# Patient Record
Sex: MALE | Race: WHITE | NOT HISPANIC OR LATINO | Employment: FULL TIME | URBAN - METROPOLITAN AREA
[De-identification: names, ages, dates, MRNs, and addresses within clinical notes are randomized per-mention and may not be internally consistent; named-entity substitution may affect disease eponyms.]

---

## 2021-05-26 ENCOUNTER — RECORDS - HEALTHEAST (OUTPATIENT)
Dept: ADMINISTRATIVE | Facility: CLINIC | Age: 26
End: 2021-05-26

## 2021-07-30 ENCOUNTER — HOSPITAL ENCOUNTER (EMERGENCY)
Facility: HOSPITAL | Age: 26
Discharge: HOME | End: 2021-07-30
Attending: STUDENT IN AN ORGANIZED HEALTH CARE EDUCATION/TRAINING PROGRAM | Admitting: STUDENT IN AN ORGANIZED HEALTH CARE EDUCATION/TRAINING PROGRAM
Payer: COMMERCIAL

## 2021-07-30 VITALS
RESPIRATION RATE: 16 BRPM | DIASTOLIC BLOOD PRESSURE: 62 MMHG | SYSTOLIC BLOOD PRESSURE: 121 MMHG | HEART RATE: 48 BPM | WEIGHT: 159.83 LBS | OXYGEN SATURATION: 99 % | TEMPERATURE: 97.7 F

## 2021-07-30 DIAGNOSIS — H00.015 HORDEOLUM EXTERNUM OF LEFT LOWER EYELID: ICD-10-CM

## 2021-07-30 PROCEDURE — 99283 EMERGENCY DEPT VISIT LOW MDM: CPT

## 2021-07-30 RX ORDER — TETRACAINE HYDROCHLORIDE 5 MG/ML
1-2 SOLUTION OPHTHALMIC ONCE
Status: DISCONTINUED | OUTPATIENT
Start: 2021-07-30 | End: 2021-07-30 | Stop reason: HOSPADM

## 2021-07-30 RX ORDER — PROPARACAINE HYDROCHLORIDE 5 MG/ML
1 SOLUTION/ DROPS OPHTHALMIC ONCE
Status: DISCONTINUED | OUTPATIENT
Start: 2021-07-30 | End: 2021-07-30 | Stop reason: CLARIF

## 2021-07-30 NOTE — ED TRIAGE NOTES
He started to have swelling and  Pain in his left eye a few days ago. Denies anything getting into the eye. He is a  for cars.

## 2021-07-30 NOTE — ED PROVIDER NOTES
Emergency Department Encounter         FINAL IMPRESSION:  Eyelid swelling        ED COURSE AND MEDICAL DECISION MAKING       ED Course as of Jul 30 1038   Fri Jul 30, 2021   1015 Patient is a 25-year-old healthy male here with left eye pain.  Patient states on Monday he noticed some increasing pain.  Denies any specific event where something fell into it however he had increasing blurry vision and discomfort in the lower lid.  No fevers, chills, nausea vomiting.  States this morning woke up with continued symptoms and came here for evaluation.  On arrival he looks well.  Is not wear glasses.  Plan for fluorescein staining and reevaluate.  The eye itself has some mild redness on the inferior aspect of the lower palpebral lid.  No signs of hypopyon or hyphema.  No stye appreciated.      1033 Fluorescein stain showed no infiltrate or damage or foreign body. Patient does have some subtle redness and swelling the left lower lid approximately half a centimeter away from the medial canthus suggesting possible early stye. Recommending conservative care measures at home and ophthalmology follow-up as needed.      1034 No facial cellulitis.      10:09 AM Met with patient for initial evaluation and discussed plan for care in the Emergency Department.                          At the conclusion of the encounter I discussed the results of all the tests and the disposition. The questions were answered. The patient or family acknowledged understanding and was agreeable with the care plan.                  MEDICATIONS GIVEN IN THE EMERGENCY DEPARTMENT:  Medications - No data to display    NEW PRESCRIPTIONS STARTED AT TODAY'S ED VISIT:  New Prescriptions    No medications on file       HPI     Patient information obtained from: patient    Use of Interpretor: N/A     Oleg Gonzalez is a 25 year old male with no pertinent history who presents to this ED via car for evaluation of left eye pain.     The patient noticed Monday night  that his left eye was beginning to feel tender, it especially hurts to touch. Yesterday the eye became swelled up. The pain is most notable in the bottom eyelid. He is a  but does not remember getting anything in his eye. He denies any other complaints.       REVIEW OF SYSTEMS:  Review of Systems   Constitutional: Negative for fever, malaise  HEENT: Negative runny nose, sore throat, ear pain, neck pain. Left eye pain and swelling.   Respiratory: Negative for shortness of breath, cough, congestion  Cardiovascular: Negative for chest pain, leg edema  Gastrointestinal: Negative for abdominal distention, abdominal pain, constipation, vomiting, nausea, diarrhea  Genitourinary: Negative for dysuria and hematuria.   Integument: Negative for rash, skin breakdown  Neurological: Negative for paresthesias, weakness, headache.  Musculoskeletal: Negative for joint pain, joint swelling      All other systems reviewed and are negative.          MEDICAL HISTORY     No past medical history on file.    No past surgical history on file.    Social History     Tobacco Use     Smoking status: Not on file   Substance Use Topics     Alcohol use: Not on file     Drug use: Not on file       No current outpatient medications on file.          PHYSICAL EXAM     /62   Pulse (!) 48   Temp 97.7  F (36.5  C) (Temporal)   Resp 16   Wt 72.5 kg (159 lb 13.3 oz)   SpO2 99%       PHYSICAL EXAM:     General: Patient appears well, nontoxic, comfortable  HEENT: Moist mucous membranes, no tongue swelling.  No head trauma.  No midline neck pain.  Subtle swelling and redness noted to the inferior medial aspect of the left lower palpebral region.  Pupils normal.  Extraocular motion intact.  No scleral injection.  No hyphema.  No hypopyon.  No facial cellulitis.    Musculoskeletal: Full range of motion of joints, no deformities appreciated.  Neurological: Alert and oriented, grossly neurologically intact.  Psychological: Normal affect and  mood.  Integument: No rashes appreciated          RESULTS       Labs Ordered and Resulted from Time of ED Arrival Up to the Time of Departure from the ED - No data to display    No orders to display                     PROCEDURES:  Procedures:  Procedures     PROCEDURE:  LAMP   INDICATIONS:  Eye lid pain   PROCEDURE PROVIDER: Lita Holloway. Angelito Miller MD.    SITE:  Left eye   MEDICATION:  Fluorescein and proparacaine   NOTE:  Eye was anesthetized.  Fluorescein instilled.  No signs of foreign body.  No signs of abnormal uptake.     COMPLICATIONS:  None       I, Debo Segovia am serving as a scribe to document services personally performed by Angelito Miller DO, based on my observations and the provider's statements to me.  I, Angelito Miller DO, attest that Debo Segovia is acting in a scribe capacity, has observed my performance of the services and has documented them in accordance with my direction.    Angelito Miller DO  Emergency Medicine  [facility] EMERGENCY DEPARTMENT     Angelito Miller DO  07/30/21 105

## 2022-04-02 ENCOUNTER — HEALTH MAINTENANCE LETTER (OUTPATIENT)
Age: 27
End: 2022-04-02

## 2022-10-01 ENCOUNTER — HEALTH MAINTENANCE LETTER (OUTPATIENT)
Age: 27
End: 2022-10-01

## 2022-12-19 ENCOUNTER — TRANSCRIBE ORDERS (OUTPATIENT)
Dept: OTHER | Age: 27
End: 2022-12-19

## 2022-12-19 DIAGNOSIS — G89.29 CHRONIC LEFT-SIDED LOW BACK PAIN WITHOUT SCIATICA: Primary | ICD-10-CM

## 2022-12-19 DIAGNOSIS — M79.18 LEFT BUTTOCK PAIN: ICD-10-CM

## 2022-12-19 DIAGNOSIS — M54.50 CHRONIC LEFT-SIDED LOW BACK PAIN WITHOUT SCIATICA: Primary | ICD-10-CM

## 2022-12-21 ENCOUNTER — THERAPY VISIT (OUTPATIENT)
Dept: PHYSICAL THERAPY | Facility: CLINIC | Age: 27
End: 2022-12-21
Payer: COMMERCIAL

## 2022-12-21 DIAGNOSIS — G89.29 CHRONIC LEFT-SIDED LOW BACK PAIN WITHOUT SCIATICA: ICD-10-CM

## 2022-12-21 DIAGNOSIS — M54.50 CHRONIC LEFT-SIDED LOW BACK PAIN WITHOUT SCIATICA: ICD-10-CM

## 2022-12-21 DIAGNOSIS — M79.18 LEFT BUTTOCK PAIN: ICD-10-CM

## 2022-12-21 PROCEDURE — 97110 THERAPEUTIC EXERCISES: CPT | Mod: GP

## 2022-12-21 PROCEDURE — 97161 PT EVAL LOW COMPLEX 20 MIN: CPT | Mod: GP

## 2022-12-21 NOTE — PROGRESS NOTES
"Physical Therapy Initial Evaluation  Subjective:  The history is provided by the patient.   Patient Health History  Oleg Gonzalez being seen for L hip/buttock pain.     Problem began: 12/21/2007.   Problem occurred: insideous onset   Pain is reported as 4/10 on pain scale.  General health as reported by patient is good.  Pertinent medical history includes: smoking and chemical dependency.   Red flags:  None as reported by patient.             Current occupation is .   Primary job tasks include:  Computer work, driving, prolonged sitting, prolonged standing, pushing/pulling and operating a machine/assembly.                  Therapist Generated HPI Evaluation  Problem details: Pt presents to PT with low back pain, L buttock pain. Started when he was 13 when he was running to first base, felt like he pulled hamstring, played through it. Played for a few weeks after this, saw a school  that gave him hamstring exercises, this helped. Pt states he got back playing hockey a few months latera nd it was sore. Now the pain is higher up in glute, noticed this a few years later, especially after a long day of sport. It hasn't gone away since. Constant dull ache, has gotten worse. Pt states the pain was so bad that he switched job duties, but continues to have pain especially with movement.     In sitting, 0/10. When moving hip, 3/10. Pain is lateral L glute and occasionally shoots down L posterior/lateral thigh.     Pt has gone to a chiro since he was 11, pt states 15 sessions per year, sessions usually consist of \"going on the rollers, working knots out, and manipulations.\"  Chiro hasn't been helping as much as he would hope.     Pt is a , pt also plays baseball, hockey, and golf. .         Type of problem:  Lumbar.    This is a chronic condition.      Patient reports pain:  Lumbar spine left.    Pain radiates to:  Gluteals left and thigh left.                                  System    Physical " Exam    General     ROS    Red Flag Screen: (Bold when present) - reviewed the following and denies  Cauda equina: progressive motor or sensory loss, urinary retention or overflow incontinence, new fecal incontinence, saddle anesthesia, significant motor deficits encompassing multiple nerve roots  Fracture: Significant trauma, prolonged corticosteroid use, osteoporosis, age >70  Infection: spinal procedure in the last 12 months, IV drug use, immunosuppression, fever, wound in spinal region, generally unwell  Malignancy: history of metastatic cancer, unexplained weight loss, severe pain at night      LUMBAR OBJECTIVE:    Static Posture/Observations:   Sitting (good/fair/poor): good  Standing (good/fair/poor):good  Lordosis (red/acc/normal): reduced    Overall decreased lumbar lordosis and posterior pelvic tilt. Also L anterior innominate rotation noted upon palpation examination.       LUMBAR AROM (in standing): (Major, Moderate, Minimal or Nil loss)  Movement Loss Jb Mod Min Nil Pain Comments   Flexion   x   into L glute  L lateral shift noted with flexion, no improvement in pain with manual correction   Extension  x    into L glute    Side Glide L    x     Side Glide R    x     Trunk rotation L         Trunk rotation R             Repeated Movements:  Flexion in standing: no increase in symptoms  Extension in prone: increase in L glute pain and low back pain  Flexion in supine: decrease in symptoms   Directional Preference: likely flexion, however will re-assess next session    Dynamic Movement Screen:    SL Balance:   R: 30 sec; observations: Trendelenburg pattern  L: 30 sec; observations: Trendelenburg pattern      Neurological:     Myotome Testing:  NA    Reflex Testing: NA    Dermatome Testing: NA      Dural Signs:   L R   Slump - -   SLR + -     Hip and Knee Screen:     MMT Hip Abduction Hip Adduction Hip Flexion Hip Extension Hip IR Hip ER   Left 3+/5 4/5 5/5 4/5 4/5 4/5   Right 3+/5 4/5 5/5 4/5 4/5 4/5  "    MMT Knee Ext Knee Flexion   Left 5/5 5/5   Right 5/5 5/5     Hip PROM: (* indicates patient's primary complaint)    WNL and symmetrical, no pain reproduction      SIJ Screen:  SIJ Tests Positive (+)/Negative (-)/painful but not provocative of patient complaint (+/-)   Thigh thrust    Distraction    Compression    Sacral Thrust -   Active SLR +   Other      Palpation:   Patient is tender to palpation at the following structures: L glute med muscle belly, L piriformis  Patient is non-tender to palpation at the following structures: L QL, paraspinals    Special Tests  Spring testing: Negative at L3/L4/L5, pt stated this felt \"relieving\"          ASSESSMENT/PLAN    KEY PT FINDINGS:  1) decreased lumbar mobility  2) decreased glute strength B  3) decreased dynamic functional mobility      Therapist Assessment: Oleg Gonzalez is a 27 year old male patient presenting to Physical Therapy with L glute pain/low back pain. Patient demonstrates decreased lumbar spine mobility, decreased B glute strength, and decreased dynamic functional mobility. Subjective history and objective findings are consistent with lumbar derangement. These impairments limit this patient's ability to perform work duties and participate in sport. Skilled PT services are necessary in order to reduce impairments and maximize independent function.    Patient is a 27 year old male with lumbar and left side hip complaints.    Patient has the following significant findings with corresponding treatment plan.                Diagnosis 1:  L hip/low back pain  Pain -  manual therapy, self management, education, directional preference exercise and home program  Decreased ROM/flexibility - manual therapy, therapeutic exercise, therapeutic activity and home program  Decreased strength - therapeutic exercise, therapeutic activities and home program  Impaired muscle performance - neuro re-education and home program  Decreased function - therapeutic activities and " home program    Cumulative Therapy Evaluation is: Low complexity.    Previous and current functional limitations:  (See Goal Flow Sheet for this information)    Short term and Long term goals: (See Goal Flow Sheet for this information)     Communication ability:  Patient appears to be able to clearly communicate and understand verbal and written communication and follow directions correctly.  Treatment Explanation - The following has been discussed with the patient:   RX ordered/plan of care  Anticipated outcomes  Possible risks and side effects  This patient would benefit from PT intervention to resume normal activities.   Rehab potential is good.    Frequency:  1 X week, once daily  Duration:  for 8 weeks  Discharge Plan:  Achieve all LTG.  Independent in home treatment program.  Reach maximal therapeutic benefit.    Please refer to the daily flowsheet for treatment today, total treatment time and time spent performing 1:1 timed codes.

## 2023-01-02 ENCOUNTER — THERAPY VISIT (OUTPATIENT)
Dept: PHYSICAL THERAPY | Facility: CLINIC | Age: 28
End: 2023-01-02
Payer: COMMERCIAL

## 2023-01-02 DIAGNOSIS — G89.29 CHRONIC LEFT-SIDED LOW BACK PAIN WITHOUT SCIATICA: Primary | ICD-10-CM

## 2023-01-02 DIAGNOSIS — M54.50 CHRONIC LEFT-SIDED LOW BACK PAIN WITHOUT SCIATICA: Primary | ICD-10-CM

## 2023-01-02 PROCEDURE — 97140 MANUAL THERAPY 1/> REGIONS: CPT | Mod: GP

## 2023-01-02 PROCEDURE — 97110 THERAPEUTIC EXERCISES: CPT | Mod: GP

## 2023-01-09 ENCOUNTER — THERAPY VISIT (OUTPATIENT)
Dept: PHYSICAL THERAPY | Facility: CLINIC | Age: 28
End: 2023-01-09
Payer: COMMERCIAL

## 2023-01-09 DIAGNOSIS — M54.50 CHRONIC LEFT-SIDED LOW BACK PAIN WITHOUT SCIATICA: Primary | ICD-10-CM

## 2023-01-09 DIAGNOSIS — G89.29 CHRONIC LEFT-SIDED LOW BACK PAIN WITHOUT SCIATICA: Primary | ICD-10-CM

## 2023-01-09 PROCEDURE — 97112 NEUROMUSCULAR REEDUCATION: CPT | Mod: GP

## 2023-01-09 PROCEDURE — 97140 MANUAL THERAPY 1/> REGIONS: CPT | Mod: GP

## 2023-01-09 PROCEDURE — 97110 THERAPEUTIC EXERCISES: CPT | Mod: GP

## 2023-01-12 ENCOUNTER — THERAPY VISIT (OUTPATIENT)
Dept: PHYSICAL THERAPY | Facility: CLINIC | Age: 28
End: 2023-01-12
Payer: COMMERCIAL

## 2023-01-12 DIAGNOSIS — M54.50 CHRONIC LEFT-SIDED LOW BACK PAIN WITHOUT SCIATICA: Primary | ICD-10-CM

## 2023-01-12 DIAGNOSIS — G89.29 CHRONIC LEFT-SIDED LOW BACK PAIN WITHOUT SCIATICA: Primary | ICD-10-CM

## 2023-01-12 PROCEDURE — 97110 THERAPEUTIC EXERCISES: CPT | Mod: GP

## 2023-01-12 PROCEDURE — 97530 THERAPEUTIC ACTIVITIES: CPT | Mod: GP

## 2023-01-16 ENCOUNTER — THERAPY VISIT (OUTPATIENT)
Dept: PHYSICAL THERAPY | Facility: CLINIC | Age: 28
End: 2023-01-16
Payer: COMMERCIAL

## 2023-01-16 DIAGNOSIS — G89.29 CHRONIC LEFT-SIDED LOW BACK PAIN WITHOUT SCIATICA: Primary | ICD-10-CM

## 2023-01-16 DIAGNOSIS — M54.50 CHRONIC LEFT-SIDED LOW BACK PAIN WITHOUT SCIATICA: Primary | ICD-10-CM

## 2023-01-16 PROCEDURE — 97140 MANUAL THERAPY 1/> REGIONS: CPT | Mod: GP

## 2023-01-16 PROCEDURE — 97110 THERAPEUTIC EXERCISES: CPT | Mod: GP

## 2023-01-27 ENCOUNTER — THERAPY VISIT (OUTPATIENT)
Dept: PHYSICAL THERAPY | Facility: CLINIC | Age: 28
End: 2023-01-27
Payer: COMMERCIAL

## 2023-01-27 DIAGNOSIS — G89.29 CHRONIC LEFT-SIDED LOW BACK PAIN WITHOUT SCIATICA: Primary | ICD-10-CM

## 2023-01-27 DIAGNOSIS — M54.50 CHRONIC LEFT-SIDED LOW BACK PAIN WITHOUT SCIATICA: Primary | ICD-10-CM

## 2023-01-27 PROCEDURE — 97140 MANUAL THERAPY 1/> REGIONS: CPT | Mod: GP

## 2023-01-27 PROCEDURE — 97110 THERAPEUTIC EXERCISES: CPT | Mod: GP

## 2023-03-06 ENCOUNTER — THERAPY VISIT (OUTPATIENT)
Dept: PHYSICAL THERAPY | Facility: CLINIC | Age: 28
End: 2023-03-06
Payer: COMMERCIAL

## 2023-03-06 DIAGNOSIS — M54.50 CHRONIC LEFT-SIDED LOW BACK PAIN WITHOUT SCIATICA: Primary | ICD-10-CM

## 2023-03-06 DIAGNOSIS — G89.29 CHRONIC LEFT-SIDED LOW BACK PAIN WITHOUT SCIATICA: Primary | ICD-10-CM

## 2023-03-06 DIAGNOSIS — M79.18 LEFT BUTTOCK PAIN: ICD-10-CM

## 2023-03-06 PROCEDURE — 97110 THERAPEUTIC EXERCISES: CPT | Mod: GP | Performed by: PHYSICAL THERAPIST

## 2023-03-17 NOTE — PROGRESS NOTES
PROGRESS  REPORT    Progress reporting period is from 12/21/22 to 3/6/23.       SUBJECTIVE  Subjective changes noted by patient: Patient returns after a 5 week hiatus from PT. He reports that he had some increased pull/strain with sideglides after last visit so he gave the sideglides a break for 2-3 weeks. He noted some increase in leg symptoms with stopping the sideglides, no significant change in pain in buttock. He has resumed SG in the last 2 weeks and notes improvement in leg symptoms. Doing SG every 2 hrs, nerve glides and hip hikes about 2 times per day. Current pain localized to L IC region.    Current pain level is 6/10.     Previous pain level was 7/10.   Changes in function:  None  Adverse reaction to treatment or activity: None    OBJECTIVE  Changes noted in objective findings: L lateral shift in standing and walking. Lumbar AROM flexion hands to ankles with R deviation and mid to end range pain, extension mod loss with end range pain, SG min loss R. Improved pain and ROM following treatment today.     ASSESSMENT/PLAN  Updated problem list and treatment plan: Diagnosis 1:  L hip/low back pain  Pain -  manual therapy, self management, education, directional preference exercise and home program  Decreased ROM/flexibility - manual therapy, therapeutic exercise, therapeutic activity and home program  Decreased strength - therapeutic exercise, therapeutic activities and home program  Impaired muscle performance - neuro re-education and home program  Decreased function - therapeutic activities and home program  STG/LTGs have been met or progress has been made towards goals:  None  Assessment of Progress: The patient's condition is unchanged.  Self Management Plans:  Patient has been instructed in a home treatment program.  I have re-evaluated this patient and find that the nature, scope, duration and intensity of the therapy is appropriate for the medical condition of the patient.  Oleg continues to require  the following intervention to meet STG and LTG's:  PT    Recommendations:  This patient would benefit from continued therapy.     Frequency:  2 X a month, once daily  Duration:  for 1-2 months      This patient would benefit from further evaluation.    Please refer to the daily flowsheet for treatment today, total treatment time and time spent performing 1:1 timed codes.

## 2023-03-20 ENCOUNTER — THERAPY VISIT (OUTPATIENT)
Dept: PHYSICAL THERAPY | Facility: CLINIC | Age: 28
End: 2023-03-20
Payer: COMMERCIAL

## 2023-03-20 DIAGNOSIS — G89.29 CHRONIC LEFT-SIDED LOW BACK PAIN WITHOUT SCIATICA: Primary | ICD-10-CM

## 2023-03-20 DIAGNOSIS — M79.18 LEFT BUTTOCK PAIN: ICD-10-CM

## 2023-03-20 DIAGNOSIS — M54.50 CHRONIC LEFT-SIDED LOW BACK PAIN WITHOUT SCIATICA: Primary | ICD-10-CM

## 2023-03-20 PROCEDURE — 97530 THERAPEUTIC ACTIVITIES: CPT | Mod: GP | Performed by: PHYSICAL THERAPIST

## 2023-03-20 NOTE — PROGRESS NOTES
DISCHARGE REPORT    Progress reporting period is from 12/21/22 to 3/20/23.       SUBJECTIVE  Subjective changes noted by patient:  Patient returns after a 2 week hiatus from PT. He reports that the new exercise (FIR to L) feels good/helpful. Overall, pain is about the same. At this point, his only activities are working and then laying/resting. He can only get very minimal, short term relief from symptoms but has seen no lasting change since onset of PT.     Current pain level is 7/10  .     Previous pain level was 7/10.   Changes in function:  None  Adverse reaction to treatment or activity: None    OBJECTIVE  Changes noted in objective findings: L lateral shift in standing and walking. Lumbar AROM flexion hands to ankles with R deviation and mid to end range pain, extension mod loss with end range pain, SG min loss R.         ASSESSMENT/PLAN  Updated problem list and treatment plan: Diagnosis 1:  L hip/low back pain  Pain -  manual therapy, self management, education, directional preference exercise and home program  Decreased ROM/flexibility -  home program  Decreased strength - program  Impaired muscle performance - home program  Decreased function - home program  STG/LTGs have been met or progress has been made towards goals:  None  Assessment of Progress: The patient's condition is unchanged.  Self Management Plans:  Patient has been instructed in a home treatment program.  I have re-evaluated this patient and find that the nature, scope, duration and intensity of the therapy is appropriate for the medical condition of the patient.  Oleg continues to require the following intervention to meet STG and LTG's:  Further evaluation warranted    Recommendations:  This patient would benefit from further evaluation. Recommend referral to orthopedic/spine specialist for consideration of medical intervention.    Please refer to the daily flowsheet for treatment today, total treatment time and time spent performing 1:1  timed codes.

## 2023-05-13 ENCOUNTER — HEALTH MAINTENANCE LETTER (OUTPATIENT)
Age: 28
End: 2023-05-13

## 2023-05-15 ENCOUNTER — OFFICE VISIT (OUTPATIENT)
Dept: PHYSICAL MEDICINE AND REHAB | Facility: CLINIC | Age: 28
End: 2023-05-15
Payer: COMMERCIAL

## 2023-05-15 VITALS
HEART RATE: 57 BPM | BODY MASS INDEX: 21.07 KG/M2 | WEIGHT: 159 LBS | DIASTOLIC BLOOD PRESSURE: 69 MMHG | HEIGHT: 73 IN | SYSTOLIC BLOOD PRESSURE: 145 MMHG

## 2023-05-15 DIAGNOSIS — M54.16 LUMBAR RADICULAR PAIN: Primary | ICD-10-CM

## 2023-05-15 DIAGNOSIS — M79.18 MYOFASCIAL PAIN: ICD-10-CM

## 2023-05-15 PROCEDURE — 99204 OFFICE O/P NEW MOD 45 MIN: CPT | Performed by: PHYSICAL MEDICINE & REHABILITATION

## 2023-05-15 RX ORDER — NAPROXEN 500 MG/1
500 TABLET ORAL 2 TIMES DAILY PRN
Qty: 60 TABLET | Refills: 1 | Status: SHIPPED | OUTPATIENT
Start: 2023-05-15

## 2023-05-15 ASSESSMENT — PAIN SCALES - GENERAL: PAINLEVEL: NO PAIN (0)

## 2023-05-15 NOTE — LETTER
5/15/2023         RE: Oleg Gonzalez  [address]  [place] [address]        Dear Colleague,    Thank you for referring your patient, Oleg Gonzalez, to the [facility]. Please see a copy of my visit note below.    Assessment/Plan:      Oleg was seen today for back pain.    Diagnoses and all orders for this visit:    Lumbar radicular pain  -     MR Lumbar Spine w/o Contrast; Future  -     naproxen (NAPROSYN) 500 MG tablet; Take 1 tablet (500 mg) by mouth 2 times daily as needed for moderate pain    Myofascial pain  -     MR Lumbar Spine w/o Contrast; Future  -     naproxen (NAPROSYN) 500 MG tablet; Take 1 tablet (500 mg) by mouth 2 times daily as needed for moderate pain         Assessment: Pleasant 27 year old male otherwise healthy with:    1.  Worsening 15-year history of left lower extremity radicular pain to the posterior lateral left calf consistent with L5 versus S1 radiculopathy.  Most of his pain is in the proximal gluteal region however.  He has a positive straight leg raise on exam with tight hamstrings.  He also has positive facet loading.  Spondylolisthesis and foraminal stenosis is on the differential.      Discussion:    1.  I discussed the diagnosis and treatment options.  He has had progressive symptoms despite extensive physical therapy of 8 visits and about 15 years of managing symptoms on his own.  No weakness in the lower extremities but he does has positive straight leg raise and hypertonic hamstrings today.    2.  MRI of the lumbar spine to evaluate for spondylolisthesis along with foraminal stenosis and nerve compression L5 versus S1.    3.  Trial naproxen 500 mg twice daily as needed for pain.    4.  [facility] 4 to 6 weeks [facility] with further recommendations after.      It was our pleasure caring for your patient today, if there any questions or concerns please do not hesitate to contact us.      Subjective:   Patient ID: Oleg Gonzalez is a  27 year old male.    History of Present Illness: Patient presents for evaluation of lumbar spine pain/left gluteal pain.  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  Then he basically healed up for the next few years but over the past 10 years has had significant worsening of his symptoms.  His pain is over the left lateral gluteal region along the iliac crest and will get occasional shooting pain down the posterior left leg which is intermittent to the left calf and dysesthesias or paresthesias accompanying the shooting pain.  His pain is worse with prolonged standing lifting and at the end of the day.  Working is difficult.  He is previously working as a  and doing a lot of lifting because significant pain down the leg.  Better with moving in general along with sitting.  Pain is a 7/10 at worst 4/10 today and at best.  He has been to 8 visits of physical therapy without any change in his symptoms.  Does not take any medications.  Has been to a chiropractor as well without any benefit.  Had plain film of his pelvis at [facility].    Imaging: Plain films of the pelvis AP view only were reviewed from [facility].  Report and imaging were reviewed.   This was done in March of this year..  Normal hip joint spaces.  No lumbar scoliosis.  No lateral view was done however to evaluate for spondylolisthesis.       Review of Systems: Complains of some muscle pain.  Denies fever, weight loss, waking, headache, change in vision, chest pain, shortness of breath, abdominal pain, nausea, vomiting, bowel or bladder incontinence, skin issues, balance issues. Remainder of 12 point review systems negative unless listed above.    History reviewed. No pertinent past  "medical history.    Family History   Problem Relation Age of Onset     No Known Problems Mother      Hyperlipidemia Father      Hypertension Father          Social History     Socioeconomic History     Marital status: Single     Spouse name: None     Number of children: None     Years of education: None     Highest education level: None   Tobacco Use     Smoking status: Every Day     Types: Cigarettes     Smokeless tobacco: Never   Vaping Use     Vaping status: Every Day   Substance and Sexual Activity     Alcohol use: Not Currently     Social history: Single.  Works as an .  Smokes e-cigarettes encourage smoking cessation.  No alcohol.  Does smoke marijuana.    The following portions of the patient's history were reviewed and updated as appropriate: allergies, current medications, past family history, past medical history, past social history, past surgical history and problem list.    Oswestry (WILMAR) Questionnaire        5/15/2023     1:40 PM   OSWESTRY DISABILITY INDEX   Count 10   Sum 24   Oswestry Score (%) 48 %       Neck Disability Index:       View : No data to display.                   PHQ-2 Score:         5/15/2023     1:07 PM   PHQ-2 ( 1999 [employer])   Q1: Little interest or pleasure in doing things 0   Q2: Feeling down, depressed or hopeless 0   PHQ-2 Score 0                  Objective:   Physical Exam:    BP (!) 145/69   Pulse 57   Ht 6' 1\" (1.854 m)   Wt 159 lb (72.1 kg)   BMI 20.98 kg/m    Body mass index is 20.98 kg/m .      General:  Well-appearing male in no acute distress.  Pleasant, cooperative, and interactive throughout the examination and interview.  CV: No lower extremity edema on inspection or paltation.  Lymphatics: No cervical lymphadenopathy palpated. Eyes: sclera clear. Skin: No rashes or lesions seen over the head/neck, hairline, arms, legs, trunk.  Respirations unlabored.  MSK: Gait is nonantalgic.  Able to heel-toe walk without difficulty.  Negative Romberg.  " Spine: normal AP curves of the C, T, and L spine.  Full range of motion in the Lumbar spine in all planes.  Facet loading to the left however causes pain.  Palpation: No significant tenderness over the lumbar spine gluteal tissues SI joint PSIS.  Extremities: Full range of motion of the elbows, and wrists with no effusions or tenderness to palpation.   Full range of motion of the hips, knees, and ankles with no effusions or tenderness to palpation.  Negative scour maneuver and Tejinder's test bilaterally. No hypermobility of the upper or lower extremities.  Neurologic exam: Mental status: Patient is alert and oriented with normal affect.  Attention, knowledge, memory, and language are intact.  Normal coordination throughout the examination.  Reflexes are 2+ and symmetric biceps, triceps, brachioradialis, patellar, and Achilles with down-going toes and Negative Darryl's.  Sensation is intact to light touch throughout the upper and lower extremities bilaterally.  Manual muscle testing reveals 5 out of 5 in the hip flexors, knee flexors/extensors, ankle plantar flexors, ankle  dorsiflexors, and EHL.  Upper extremities: Grossly normal strength . Normal muscle bulk and tone in the arms and legs.   Positive supine straight leg raise on the left with tight hamstrings.            Again, thank you for allowing me to participate in the care of your patient.        Sincerely,        Tom Santana, DO

## 2023-05-15 NOTE — PROGRESS NOTES
Assessment/Plan:      Oleg was seen today for back pain.    Diagnoses and all orders for this visit:    Lumbar radicular pain  -     MR Lumbar Spine w/o Contrast; Future  -     naproxen (NAPROSYN) 500 MG tablet; Take 1 tablet (500 mg) by mouth 2 times daily as needed for moderate pain    Myofascial pain  -     MR Lumbar Spine w/o Contrast; Future  -     naproxen (NAPROSYN) 500 MG tablet; Take 1 tablet (500 mg) by mouth 2 times daily as needed for moderate pain         Assessment: Pleasant 27 year old male otherwise healthy with:    1.  Worsening 15-year history of left lower extremity radicular pain to the posterior lateral left calf consistent with L5 versus S1 radiculopathy.  Most of his pain is in the proximal gluteal region however.  He has a positive straight leg raise on exam with tight hamstrings.  He also has positive facet loading.  Spondylolisthesis and foraminal stenosis is on the differential.      Discussion:    1.  I discussed the diagnosis and treatment options.  He has had progressive symptoms despite extensive physical therapy of 8 visits and about 15 years of managing symptoms on his own.  No weakness in the lower extremities but he does has positive straight leg raise and hypertonic hamstrings today.    2.  MRI of the lumbar spine to evaluate for spondylolisthesis along with foraminal stenosis and nerve compression L5 versus S1.    3.  Trial naproxen 500 mg twice daily as needed for pain.    4.  Follow-up 4 to 6 weeks and via MyChart with further recommendations after.      It was our pleasure caring for your patient today, if there any questions or concerns please do not hesitate to contact us.      Subjective:   Patient ID: Oleg Gonzalez is a 27 year old male.    History of Present Illness: Patient presents for evaluation of lumbar spine pain/left gluteal pain.  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  Then he basically healed up for the next few years but over the past 10 years has had significant worsening of his symptoms.  His pain is over the left lateral gluteal region along the iliac crest and will get occasional shooting pain down the posterior left leg which is intermittent to the left calf and dysesthesias or paresthesias accompanying the shooting pain.  His pain is worse with prolonged standing lifting and at the end of the day.  Working is difficult.  He is previously working as a  and doing a lot of lifting because significant pain down the leg.  Better with moving in general along with sitting.  Pain is a 7/10 at worst 4/10 today and at best.  He has been to 8 visits of physical therapy without any change in his symptoms.  Does not take any medications.  Has been to a chiropractor as well without any benefit.  Had plain film of his pelvis at [facility].    Imaging: Plain films of the pelvis AP view only were reviewed from [facility].  Report and imaging were reviewed.   This was done in March of this year..  Normal hip joint spaces.  No lumbar scoliosis.  No lateral view was done however to evaluate for spondylolisthesis.       Review of Systems: Complains of some muscle pain.  Denies fever, weight loss, waking, headache, change in vision, chest pain, shortness of breath, abdominal pain, nausea, vomiting, bowel or bladder incontinence, skin issues, balance issues. Remainder of 12 point review systems negative unless listed above.    History reviewed. No pertinent past medical history.    Family History   Problem Relation Age of Onset     No Known Problems Mother      Hyperlipidemia Father      Hypertension Father          Social History     Socioeconomic History     Marital status: Single     Spouse name: None     Number of  "children: None     Years of education: None     Highest education level: None   Tobacco Use     Smoking status: Every Day     Types: Cigarettes     Smokeless tobacco: Never   Vaping Use     Vaping status: Every Day   Substance and Sexual Activity     Alcohol use: Not Currently     Social history: Single.  Works as an .  Smokes e-cigarettes encourage smoking cessation.  No alcohol.  Does smoke marijuana.    The following portions of the patient's history were reviewed and updated as appropriate: allergies, current medications, past family history, past medical history, past social history, past surgical history and problem list.    Oswestry (WILMAR) Questionnaire        5/15/2023     1:40 PM   OSWESTRY DISABILITY INDEX   Count 10   Sum 24   Oswestry Score (%) 48 %       Neck Disability Index:       View : No data to display.                   PHQ-2 Score:         5/15/2023     1:07 PM   PHQ-2 ( 1999 [employer])   Q1: Little interest or pleasure in doing things 0   Q2: Feeling down, depressed or hopeless 0   PHQ-2 Score 0                  Objective:   Physical Exam:    BP (!) 145/69   Pulse 57   Ht 6' 1\" (1.854 m)   Wt 159 lb (72.1 kg)   BMI 20.98 kg/m    Body mass index is 20.98 kg/m .      General:  Well-appearing male in no acute distress.  Pleasant, cooperative, and interactive throughout the examination and interview.  CV: No lower extremity edema on inspection or paltation.  Lymphatics: No cervical lymphadenopathy palpated. Eyes: sclera clear. Skin: No rashes or lesions seen over the head/neck, hairline, arms, legs, trunk.  Respirations unlabored.  MSK: Gait is nonantalgic.  Able to heel-toe walk without difficulty.  Negative Romberg.  Spine: normal AP curves of the C, T, and L spine.  Full range of motion in the Lumbar spine in all planes.  Facet loading to the left however causes pain.  Palpation: No significant tenderness over the lumbar spine gluteal tissues SI joint PSIS.  Extremities: " Full range of motion of the elbows, and wrists with no effusions or tenderness to palpation.   Full range of motion of the hips, knees, and ankles with no effusions or tenderness to palpation.  Negative scour maneuver and Tejinder's test bilaterally. No hypermobility of the upper or lower extremities.  Neurologic exam: Mental status: Patient is alert and oriented with normal affect.  Attention, knowledge, memory, and language are intact.  Normal coordination throughout the examination.  Reflexes are 2+ and symmetric biceps, triceps, brachioradialis, patellar, and Achilles with down-going toes and Negative Darryl's.  Sensation is intact to light touch throughout the upper and lower extremities bilaterally.  Manual muscle testing reveals 5 out of 5 in the hip flexors, knee flexors/extensors, ankle plantar flexors, ankle  dorsiflexors, and EHL.  Upper extremities: Grossly normal strength . Normal muscle bulk and tone in the arms and legs.   Positive supine straight leg raise on the left with tight hamstrings.

## 2023-05-15 NOTE — PATIENT INSTRUCTIONS
An MRI was ordered for you today.  You will be contacted by scheduling within 3 days.    If you are not contacted, please call Radiology at [phone].    Naproxen (which is an anti-inflammatory) medication is prescribed today. Take 1 tablet 2 times a day as needed for pain.This medication should be taken with food and water to prevent any stomach upset. Do not take ibuprofen/Advil/Motrin/Aleve  while you take Naproxen. Please call if you have any side effects.

## 2023-05-19 ENCOUNTER — HOSPITAL ENCOUNTER (OUTPATIENT)
Dept: MRI IMAGING | Facility: HOSPITAL | Age: 28
Discharge: HOME | End: 2023-05-19
Attending: PHYSICAL MEDICINE & REHABILITATION | Admitting: PHYSICAL MEDICINE & REHABILITATION
Payer: COMMERCIAL

## 2023-05-19 DIAGNOSIS — M54.16 LUMBAR RADICULAR PAIN: ICD-10-CM

## 2023-05-19 DIAGNOSIS — M79.18 MYOFASCIAL PAIN: ICD-10-CM

## 2023-05-19 PROCEDURE — 72148 MRI LUMBAR SPINE W/O DYE: CPT

## 2023-06-19 ENCOUNTER — OFFICE VISIT (OUTPATIENT)
Dept: PHYSICAL MEDICINE AND REHAB | Facility: CLINIC | Age: 28
End: 2023-06-19
Payer: COMMERCIAL

## 2023-06-19 VITALS — SYSTOLIC BLOOD PRESSURE: 133 MMHG | HEART RATE: 55 BPM | DIASTOLIC BLOOD PRESSURE: 67 MMHG

## 2023-06-19 DIAGNOSIS — M54.16 LUMBAR RADICULAR PAIN: ICD-10-CM

## 2023-06-19 DIAGNOSIS — M48.061 STENOSIS OF LATERAL RECESS OF LUMBAR SPINE: ICD-10-CM

## 2023-06-19 DIAGNOSIS — M51.26 LUMBAR DISC HERNIATION: Primary | ICD-10-CM

## 2023-06-19 DIAGNOSIS — M79.18 MYOFASCIAL PAIN: ICD-10-CM

## 2023-06-19 PROCEDURE — 99214 OFFICE O/P EST MOD 30 MIN: CPT | Performed by: PHYSICAL MEDICINE & REHABILITATION

## 2023-06-19 RX ORDER — NABUMETONE 500 MG/1
500-1000 TABLET, FILM COATED ORAL 2 TIMES DAILY PRN
Qty: 90 TABLET | Refills: 1 | Status: SHIPPED | OUTPATIENT
Start: 2023-06-19

## 2023-06-19 ASSESSMENT — PAIN SCALES - GENERAL: PAINLEVEL: SEVERE PAIN (6)

## 2023-06-19 NOTE — PROGRESS NOTES
Assessment/Plan:      Oleg was seen today for back pain.    Diagnoses and all orders for this visit:    Lumbar disc herniation  -     Pain Transforaminal Ashley Inj Lmbscrl 2 Lv Lt; Future  -     nabumetone (RELAFEN) 500 MG tablet; Take 1-2 tablets (500-1,000 mg) by mouth 2 times daily as needed for pain    Lumbar radicular pain  -     Pain Transforaminal Ashley Inj Lmbscrl 2 Lv Lt; Future    Stenosis of lateral recess of lumbar spine  -     Pain Transforaminal Ashley Inj Lmbscrl 2 Lv Lt; Future    Myofascial pain  -     nabumetone (RELAFEN) 500 MG tablet; Take 1-2 tablets (500-1,000 mg) by mouth 2 times daily as needed for pain         Assessment: Pleasant 27 year old male otherwise healthy with:     1.    Significant worsening of left lower extremity radicular pain over the past 2 weeks after some lighter activities.  He has a 15-year history of left lower extremity radicular pain to the posterior lateral left calf consistent with L5 versus S1 radiculopathy.  Most of his pain is in the proximal gluteal region.  He has a positive straight leg raise on exam with tight hamstrings.  Broad-based disc bulge L5-S1 with a left paracentral component results in left greater than right lateral recess stenosis compression of the left S1 nerve.  Mild broad-based disc bulge and annular tear at L4-5.  Has had 8 sessions of physical therapy.  No improvement with naproxen.         Discussion:    1. *I discussed the MRI with the patient.  We discussed options of changing anti-inflammatories, adding neuropathic pain medication, EMG, further therapy, injection, surgical evaluation.  He wants to continue conservative management.    2.  Recommend left L5-S1 and S1-S2 TFESI.    3.  Continue with his current physical therapy exercises that do not bother him.  Formal physical therapy increased his pain we will hold off on further therapy for now.    4.  Trial nabumetone 500-1000 mg twice daily as needed for pain.    5.  Follow-up with me 2 to 4  weeks after his injection.      It was our pleasure caring for your patient today, if there any questions or concerns please do not hesitate to contact us.      Subjective:   Patient ID: Oleg Gonzalez is a 27 year old male.    History of Present Illness: Patient presents for follow-up of lumbar spine pain with left lower extremity pain and paresthesias.  Symptoms worsened since last visit.  Had a significant flare of his pain over the past 2 weeks after he was seen here.  It was after some light activity.  His pain is a 10/10 at worst 6/10 today and at best.  Worse with standing or lying on his back.  He has worsening paresthesias in his left lateral calf as left buttock.  Pain is better with sitting and leaning to the right.  Tried naproxen without any benefit.  Has had his MRI done.  Was using an inversion chair which has helped some.      Imaging:MRI lumbar spine personally reviewed from May 19, 2023.  Disc height loss with broad-based disc bulge moderate facet arthropathy at L5-S1 results in moderate lateral recess stenosis bilaterally mild central stenosis.  L4-5 T2 signal loss with an annular tear posterior disc bulge.  Moderate facet arthropathy results in moderate lateral recess stenosis bilaterally mild central stenosis no foraminal stenosis.  Moderate facet arthropathy L3-4 mild at L1-2 L2-3 no other central stenosis.  On my review.  There is a left paracentral component to the disc bulge at L5-S1 with left greater than right lateral recess stenosis and compression of the left S1 nerve.    Review of Systems: Pertinent positives: Complains of weakness in the left leg and paresthesias.  Pertinent negatives:  No bowel or bladder incontinence.  No urinary retention.  No fevers, unintentional weight loss, balance changes, headaches, frequent falling, difficulty swallowing, or coordination difficulties.  All others reviewed are negative.    No past medical history on file.    The following portions of the  patient's history were reviewed and updated as appropriate: allergies, current medications, past family history, past medical history, past social history, past surgical history and problem list.           Objective:   Physical Exam:    /67   Pulse 55   There is no height or weight on file to calculate BMI.      General: Alert and oriented with normal affect. Attention, knowledge, memory, and language are intact. No acute distress.     Respirations: Unlabored. CV: No lower extremity edema.    Gait:  Nonantalgic    Sensation is intact to light touch throughout the   lower extremities.  Reflexes are   2+ patellar and Achilles    Positive seated straight leg raise on the left negative cross seated straight leg raise.    Manual muscle testing reveals:  Right /Left out of 5     5/5 hip flexors  5/5 knee flexors  5/5 knee extensors  5/5 ankle plantar flexors-on toe raises  5/5 ankle dorsiflexors  5/5  EHL

## 2023-06-19 NOTE — LETTER
6/19/2023         RE: Oleg Gonzalez  [address]  [place] [address]        Dear Colleague,    Thank you for referring your patient, Oleg Gonzalez, to the [facility]. Please see a copy of my visit note below.    Assessment/Plan:      Oleg was seen today for back pain.    Diagnoses and all orders for this visit:    Lumbar disc herniation  -     Pain Transforaminal Ashley Inj Lmbscrl 2 Lv Lt; Future  -     nabumetone (RELAFEN) 500 MG tablet; Take 1-2 tablets (500-1,000 mg) by mouth 2 times daily as needed for pain    Lumbar radicular pain  -     Pain Transforaminal Ashley Inj Lmbscrl 2 Lv Lt; Future    Stenosis of lateral recess of lumbar spine  -     Pain Transforaminal Ashley Inj Lmbscrl 2 Lv Lt; Future    Myofascial pain  -     nabumetone (RELAFEN) 500 MG tablet; Take 1-2 tablets (500-1,000 mg) by mouth 2 times daily as needed for pain         Assessment: Pleasant 27 year old male otherwise healthy with:     1.    Significant worsening of left lower extremity radicular pain over the past 2 weeks after some lighter activities.  He has a 15-year history of left lower extremity radicular pain to the posterior lateral left calf consistent with L5 versus S1 radiculopathy.  Most of his pain is in the proximal gluteal region.  He has a positive straight leg raise on exam with tight hamstrings.  Broad-based disc bulge L5-S1 with a left paracentral component results in left greater than right lateral recess stenosis compression of the left S1 nerve.  Mild broad-based disc bulge and annular tear at L4-5.  Has had 8 sessions of physical therapy.  No improvement with naproxen.         Discussion:    1. *I discussed the MRI with the patient.  We discussed options of changing anti-inflammatories, adding neuropathic pain medication, EMG, further therapy, injection, surgical evaluation.  He wants to continue conservative management.    2.  Recommend left L5-S1 and S1-S2 TFESI.    3.  Continue with  his current physical therapy exercises that do not bother him.  Formal physical therapy increased his pain we will hold off on further therapy for now.    4.  Trial nabumetone 500-1000 mg twice daily as needed for pain.    5.  Follow-up with me 2 to 4 weeks after his injection.      It was our pleasure caring for your patient today, if there any questions or concerns please do not hesitate to contact us.      Subjective:   Patient ID: Oleg Gonzalez is a 27 year old male.    History of Present Illness: Patient presents for follow-up of lumbar spine pain with left lower extremity pain and paresthesias.  Symptoms worsened since last visit.  Had a significant flare of his pain over the past 2 weeks after he was seen here.  It was after some light activity.  His pain is a 10/10 at worst 6/10 today and at best.  Worse with standing or lying on his back.  He has worsening paresthesias in his left lateral calf as left buttock.  Pain is better with sitting and leaning to the right.  Tried naproxen without any benefit.  Has had his MRI done.  Was using an inversion chair which has helped some.      Imaging:MRI lumbar spine personally reviewed from May 19, 2023.  Disc height loss with broad-based disc bulge moderate facet arthropathy at L5-S1 results in moderate lateral recess stenosis bilaterally mild central stenosis.  L4-5 T2 signal loss with an annular tear posterior disc bulge.  Moderate facet arthropathy results in moderate lateral recess stenosis bilaterally mild central stenosis no foraminal stenosis.  Moderate facet arthropathy L3-4 mild at L1-2 L2-3 no other central stenosis.  On my review.  There is a left paracentral component to the disc bulge at L5-S1 with left greater than right lateral recess stenosis and compression of the left S1 nerve.    Review of Systems: Pertinent positives: Complains of weakness in the left leg and paresthesias.  Pertinent negatives:  No bowel or bladder incontinence.  No urinary  retention.  No fevers, unintentional weight loss, balance changes, headaches, frequent falling, difficulty swallowing, or coordination difficulties.  All others reviewed are negative.    No past medical history on file.    The following portions of the patient's history were reviewed and updated as appropriate: allergies, current medications, past family history, past medical history, past social history, past surgical history and problem list.           Objective:   Physical Exam:    /67   Pulse 55   There is no height or weight on file to calculate BMI.      General: Alert and oriented with normal affect. Attention, knowledge, memory, and language are intact. No acute distress.     Respirations: Unlabored. CV: No lower extremity edema.    Gait:  Nonantalgic    Sensation is intact to light touch throughout the   lower extremities.  Reflexes are   2+ patellar and Achilles    Positive seated straight leg raise on the left negative cross seated straight leg raise.    Manual muscle testing reveals:  Right /Left out of 5     5/5 hip flexors  5/5 knee flexors  5/5 knee extensors  5/5 ankle plantar flexors-on toe raises  5/5 ankle dorsiflexors  5/5  EHL          Again, thank you for allowing me to participate in the care of your patient.        Sincerely,        Tom Santana, DO

## 2023-06-19 NOTE — PATIENT INSTRUCTIONS
A lumbar epidural injection has been ordered today. Please schedule this injection at least 2 weeks from now to allow time for insurance prior authorization. On the day of your injection, you cannot be sick or taking antibiotics. If you become sick and are prescribed, please call the clinic so your injection can be rescheduled for once you have completed your antibiotics. You will need to bring a  with you for your injection. If you have any questions or concerns prior to your injection, please do not hesitate to call the nurse navigation line at [phone].   Nabumetone (which is an anti-inflammatory) medication is prescribed today. Take 1-2 tablets 2 times a day as needed for pain. This medication should be taken with food and water to prevent any stomach upset. Do not take ibuprofen/Advil/Motrin/Aleve/naproxen while you take Nabumetone. Please call if you have any side effects.

## 2023-07-21 ENCOUNTER — RADIOLOGY INJECTION OFFICE VISIT (OUTPATIENT)
Dept: PHYSICAL MEDICINE AND REHAB | Facility: CLINIC | Age: 28
End: 2023-07-21
Attending: PHYSICAL MEDICINE & REHABILITATION
Payer: COMMERCIAL

## 2023-07-21 VITALS
SYSTOLIC BLOOD PRESSURE: 106 MMHG | OXYGEN SATURATION: 96 % | DIASTOLIC BLOOD PRESSURE: 50 MMHG | TEMPERATURE: 98.2 F | HEART RATE: 41 BPM | RESPIRATION RATE: 16 BRPM

## 2023-07-21 DIAGNOSIS — M48.061 STENOSIS OF LATERAL RECESS OF LUMBAR SPINE: ICD-10-CM

## 2023-07-21 DIAGNOSIS — M54.16 LUMBAR RADICULAR PAIN: ICD-10-CM

## 2023-07-21 DIAGNOSIS — M51.26 LUMBAR DISC HERNIATION: ICD-10-CM

## 2023-07-21 PROCEDURE — 64484 NJX AA&/STRD TFRM EPI L/S EA: CPT | Mod: LT | Performed by: PAIN MEDICINE

## 2023-07-21 PROCEDURE — 64483 NJX AA&/STRD TFRM EPI L/S 1: CPT | Mod: LT | Performed by: PAIN MEDICINE

## 2023-07-21 RX ORDER — LIDOCAINE HYDROCHLORIDE 10 MG/ML
INJECTION, SOLUTION EPIDURAL; INFILTRATION; INTRACAUDAL; PERINEURAL
Status: COMPLETED | OUTPATIENT
Start: 2023-07-21 | End: 2023-07-21

## 2023-07-21 RX ORDER — DEXAMETHASONE SODIUM PHOSPHATE 10 MG/ML
INJECTION, SOLUTION INTRAMUSCULAR; INTRAVENOUS
Status: COMPLETED | OUTPATIENT
Start: 2023-07-21 | End: 2023-07-21

## 2023-07-21 RX ADMIN — DEXAMETHASONE SODIUM PHOSPHATE 20 MG: 10 INJECTION, SOLUTION INTRAMUSCULAR; INTRAVENOUS at 09:11

## 2023-07-21 RX ADMIN — LIDOCAINE HYDROCHLORIDE 4 ML: 10 INJECTION, SOLUTION EPIDURAL; INFILTRATION; INTRACAUDAL; PERINEURAL at 09:11

## 2023-07-21 ASSESSMENT — PAIN SCALES - GENERAL
PAINLEVEL: EXTREME PAIN (8)
PAINLEVEL: NO PAIN (0)

## 2023-07-21 NOTE — PATIENT INSTRUCTIONS
DISCHARGE INSTRUCTIONS    During office hours (8:00 a.m.- 4:00 p.m.) questions or concerns may be answered  by calling [facility] Navigation Nurses at  [phone].  Messages received after hours will be returned the following business day.      In the case of an emergency, please dial 911 or seek assistance at the nearest Emergency Room/Urgent Care facility.     All Patients:    You may experience an increase in your symptoms for the first 2 days (It may take anywhere between 2 days- 2 weeks for the steroid to have maximum effect).    You may use ice on the injection site, as frequently as 20 minutes each hour if needed.    You may take your pain medicine.    You may continue taking your regular medication after your injection. If you have had a Medial Branch Block you may resume pain medication once your pain diary is completed.    You may shower. No swimming, tub bath or hot tub for 48 hours.  You may remove your bandaid/bandage as soon as you are home.    You may resume light activities, as tolerated.    Resume your usual diet as tolerated.    It is strongly advised that you do not drive for 1-3 hours post injection.    If you have had oral sedation:  Do not drive for 8 hours post injection.      If you have had IV sedation:  Do not drive for 24 hours post injection.  Do not operate hazardous machinery or make important personal/business decisions for 24 hours.      POSSIBLE STEROID SIDE EFFECTS (If steroid/cortisone was used for your procedure)    -If you experience these symptoms, it should only last for a short period    Swelling of the legs              Skin redness (flushing)     Mouth (oral) irritation   Blood sugar (glucose) levels            Sweats                    Mood changes  Headache  Sleeplessness  Weakened immune system for up to 14 days, which could increase the risk of zaki the COVID-19 virus and/or experiencing more severe symptoms of the disease, if exposed.  Decreased  effectiveness of the flu vaccine if given within 2 weeks of the steroid.         POSSIBLE PROCEDURE SIDE EFFECTS  -Call the Spine Center if you are concerned  Increased Pain           Increased numbness/tingling      Nausea/Vomiting          Bruising/bleeding at site      Redness or swelling                                              Difficulty walking      Weakness           Fever greater than 100.5    *In the event of a severe headache after an epidural steroid injection that is relieved by lying down, please call the [facility] to speak with a clinical staff member*

## 2024-07-21 ENCOUNTER — HEALTH MAINTENANCE LETTER (OUTPATIENT)
Age: 29
End: 2024-07-21

## 2025-01-07 ENCOUNTER — OFFICE VISIT (OUTPATIENT)
Dept: FAMILY MEDICINE | Facility: CLINIC | Age: 30
End: 2025-01-07
Payer: COMMERCIAL

## 2025-01-07 VITALS
HEART RATE: 75 BPM | BODY MASS INDEX: 20.15 KG/M2 | DIASTOLIC BLOOD PRESSURE: 62 MMHG | HEIGHT: 73 IN | RESPIRATION RATE: 20 BRPM | OXYGEN SATURATION: 98 % | WEIGHT: 152 LBS | SYSTOLIC BLOOD PRESSURE: 116 MMHG | TEMPERATURE: 97.9 F

## 2025-01-07 DIAGNOSIS — F90.9 ATTENTION DEFICIT HYPERACTIVITY DISORDER (ADHD), UNSPECIFIED ADHD TYPE: ICD-10-CM

## 2025-01-07 DIAGNOSIS — L72.3 SEBACEOUS CYST OF FINGER: Primary | ICD-10-CM

## 2025-01-07 PROCEDURE — 99203 OFFICE O/P NEW LOW 30 MIN: CPT

## 2025-01-07 PROCEDURE — G2211 COMPLEX E/M VISIT ADD ON: HCPCS

## 2025-01-07 ASSESSMENT — PAIN SCALES - GENERAL: PAINLEVEL_OUTOF10: NO PAIN (0)

## 2025-01-07 NOTE — PROGRESS NOTES
Assessment & Plan     Sebaceous cyst of finger  Unclear if fluid filled or fatty tissue. Will refer to dermatology for evaluation and management.   - Adult Dermatology  Referral; Future    Attention deficit hyperactivity disorder (ADHD), unspecified ADHD type  Will obtain records from Psychiatry. Once records obtain and diagnosis confirmed, will get patient started on medication. Follow up in 3 months in person or virtually. CSA reviewed and signed today.      The longitudinal plan of care for the diagnosis(es)/condition(s) as documented were addressed during this visit. Due to the added complexity in care, I will continue to support Cornelius in the subsequent management and with ongoing continuity of care.        Subjective   Cornelius is a 29 year old, presenting for the following health issues:  SKING ISSUE (Right finger. Was seen in the past and was told it was a cyst that needs draining. ) and Medication Request (Would like to start back on adderall, been 3-4 years since been off. )        1/7/2025     8:09 AM   Additional Questions   Roomed by Marcus Hester   Accompanied by Self     History of Present Illness       Reason for visit:  Remove cyst on right index finger and Adhd medication   He is taking medications regularly.       ADHD  Was seeing [place] psychiatrist [place] [facility]   [place]oved from [facility] [place], and hasn't been back.   Has been off medication for 3 years  Diagnosed at Psychiatry at age 20ish  Has noticed a decline in routine, has a desk job now and has difficulty staying focused, easily distracted. 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   Would like to go back on medication     Was last on Strattera ER, did not like strattera  Tolerates immediate release adderall best     Right index finger cyst   Popped up 3-4 years ago  No pain  Hasn't changed in size   No drainage from it  Bothersome when he types if he hits it wrong       Review of Systems  Detailed  "as above      Objective    /62   Pulse 75   Temp 97.9  F (36.6  C) (Oral)   Resp 20   Ht 1.842 m (6' 0.5\")   Wt 68.9 kg (152 lb)   SpO2 98%   BMI 20.33 kg/m    Body mass index is 20.33 kg/m .  Physical Exam   GENERAL: alert and no distress  MS: no gross musculoskeletal defects noted, no edema  SKIN: hardened cyst of right index finger            Signed Electronically by: TOMÁS Yi CNP    "

## 2025-01-07 NOTE — LETTER
[facility]  01/07/25  Patient: Oleg Gonzalez  YOB: 1995  Medical Record Number: [id number]                                                                                  Non-Opioid Controlled Substance Agreement    This is an agreement between you and your provider regarding safe and appropriate use of controlled substances prescribed by your care team. Controlled substances are?medicines that can cause physical and mental dependence (abuse).     There are strict laws about having and using these medicines. We here at [facility] are  committed to working with you in your efforts to get better. To support you in this work, we'll help you schedule regular office appointments for medicine refills. If we must cancel or change your appointment for any reason, we'll make sure you have enough medicine to last until your next appointment.     As a Provider, I will:   Listen carefully to your concerns while treating you with respect.   Recommend a treatment plan that I believe is in your best interest and may involve therapies other than medicine.    Talk with you often about the possible benefits and the risk of harm of any medicine that we prescribe for you.  Assess the safety of this medicine and check how well it works.    Provide a plan on how to taper (discontinue or go off) using this medicine if the decision is made to stop its use.      ::  As a Patient, I understand controlled substances:     Are prescribed by my care provider to help me function or work and manage my condition(s).?  Are strong medicines and can cause serious side effects.     Need to be taken exactly as prescribed.?Combining controlled substances with certain medicines or chemicals (such as illegal drugs, alcohol, sedatives, sleeping pills, and benzodiazepines) can be dangerous or even fatal.? If I stop taking my medicines suddenly, I may have severe withdrawal symptoms.     The risks, benefits, and  side effects of these medicine(s) were explained to me. I agree that:    I will take part in other treatments as advised by my care team. This may be psychiatry or counseling, physical therapy, behavioral therapy, group treatment or a referral to specialist.    I will keep all my appointments and understand this is part of the monitoring of controlled substances.?My care team may require an office visit for EVERY controlled substance refill. If I miss appointments or don t follow instructions, my care team may stop my medicine    I will take my medicines as prescribed. I will not change the dose or schedule unless my care team tells me to. There will be no refills if I run out early.      I may be asked to come to the clinic and complete a urine drug test or complete a pill count. If I don t give a urine sample or participate in a pill count, the care team may stop my medicine.    I will only receive controlled substance prescriptions from this clinic. If I am treated by another provider, I will tell them that I am taking controlled substances and that I have a treatment agreement with this provider. I will inform my [facility] care team within one business day if I am given a prescription for any controlled substance by another healthcare provider. My [facility] care team can contact other providers and pharmacists about my use of any medicines.    It is up to me to make sure that I don't run out of my medicines on weekends or holidays.?If my care team is willing to refill my prescription without a visit, I must request refills only during office hours. Refills may take up to 3 business days to process. I will use one pharmacy to fill all my controlled substance prescriptions. I will notify the clinic about any changes to my insurance or medicine availability.    I am responsible for my prescriptions. If the medicine/prescription is lost, stolen or destroyed, it will not be replaced.?I also agree not  to share controlled substance medicines with anyone.     I am aware I should not use any illegal or recreational drugs. I agree not to drink alcohol unless my care team says I can.     If I enroll in the [place] Medical Cannabis program, I will tell my care team before my next refill.    I will tell my care team right away if I become pregnant, have a new medical problem treated outside of my regular clinic, or have a change in my medicines.     I understand that this medicine can affect my thinking, judgment and reaction time.? Alcohol and drugs affect the brain and body, which can affect the safety of my driving. Being under the influence of alcohol or drugs can affect my decision-making, behaviors, personal safety and the safety of others. Driving while impaired (DWI) can occur if a person is driving, operating or in physical control of a car, motorcycle, boat, snowmobile, ATV, motorbike, off-road vehicle or any other motor vehicle ([place] Statute 169A.20). I understand the risk if I choose to drive or operate any vehicle or machinery.    I understand that if I do not follow any of the conditions above, my prescriptions or treatment may be stopped or changed.   I agree that my provider, clinic care team and pharmacy may work with any city, state or federal law enforcement agency that investigates the misuse, sale or other diversion of my controlled medicine. I will allow my provider to discuss my care with, or share a copy of, this agreement with any other treating provider, pharmacy or emergency room where I receive care.     I have read this agreement and have asked questions about anything I did not understand.    ________________________________________________________  Patient Signature - Oleg Gonzalez     ___________________                   Date     ________________________________________________________  Provider Signature - TOMÁS Yi CNP       ___________________                    Date     ________________________________________________________  Witness Signature (required if provider not present while patient signing)          ___________________                   Date

## 2025-01-14 DIAGNOSIS — F90.0 ADHD (ATTENTION DEFICIT HYPERACTIVITY DISORDER), INATTENTIVE TYPE: Primary | ICD-10-CM

## 2025-01-14 RX ORDER — ATOMOXETINE 40 MG/1
40 CAPSULE ORAL DAILY
Qty: 30 CAPSULE | Refills: 0 | Status: SHIPPED | OUTPATIENT
Start: 2025-02-14

## 2025-01-14 RX ORDER — ATOMOXETINE 40 MG/1
40 CAPSULE ORAL DAILY
Qty: 30 CAPSULE | Refills: 0 | Status: SHIPPED | OUTPATIENT
Start: 2025-03-14

## 2025-01-14 RX ORDER — ATOMOXETINE 40 MG/1
40 CAPSULE ORAL DAILY
Qty: 30 CAPSULE | Refills: 0 | Status: SHIPPED | OUTPATIENT
Start: 2025-01-14

## 2025-04-07 ENCOUNTER — OFFICE VISIT (OUTPATIENT)
Dept: INTERNAL MEDICINE | Facility: CLINIC | Age: 30
End: 2025-04-07
Payer: COMMERCIAL

## 2025-04-07 VITALS
BODY MASS INDEX: 21.2 KG/M2 | OXYGEN SATURATION: 99 % | HEIGHT: 73 IN | WEIGHT: 160 LBS | TEMPERATURE: 98.6 F | SYSTOLIC BLOOD PRESSURE: 108 MMHG | HEART RATE: 78 BPM | RESPIRATION RATE: 20 BRPM | DIASTOLIC BLOOD PRESSURE: 72 MMHG

## 2025-04-07 DIAGNOSIS — F90.9 ATTENTION DEFICIT HYPERACTIVITY DISORDER (ADHD), UNSPECIFIED ADHD TYPE: ICD-10-CM

## 2025-04-07 DIAGNOSIS — Z01.818 PREOP GENERAL PHYSICAL EXAM: Primary | ICD-10-CM

## 2025-04-07 DIAGNOSIS — M54.16 LUMBAR RADICULOPATHY: ICD-10-CM

## 2025-04-07 PROCEDURE — G2211 COMPLEX E/M VISIT ADD ON: HCPCS | Performed by: NURSE PRACTITIONER

## 2025-04-07 PROCEDURE — 3078F DIAST BP <80 MM HG: CPT | Performed by: NURSE PRACTITIONER

## 2025-04-07 PROCEDURE — 3074F SYST BP LT 130 MM HG: CPT | Performed by: NURSE PRACTITIONER

## 2025-04-07 PROCEDURE — 99214 OFFICE O/P EST MOD 30 MIN: CPT | Performed by: NURSE PRACTITIONER

## 2025-04-07 NOTE — PROGRESS NOTES
Preoperative Evaluation  [facility]  [address] [address]  Phone: [phone]  Fax: [phone]  Primary Provider: Provider Not In System  Pre-op Performing Provider: Robert Hassan CNP  Apr 7, 2025 4/7/2025   Surgical Information   What procedure is being done? back surgury   [facility]   Who is doing the procedure / surgery? removing hernited disc   Date of surgery / procedure: april 9   Time of surgery / procedure: na   Where do you plan to recover after surgery? at home alone     Fax number for surgical facility: [phone] or [phone]    Assessment & Plan     The proposed surgical procedure is considered LOW risk.    Preop general physical exam  No contraindications for planned procedure    Lumbar radiculopathy  Reported history of significant herniated disc.  Surgery for definitive treatment    Attention deficit hyperactivity disorder (ADHD), unspecified ADHD type  Controlled on Strattera    Patient Instructions   Hold all supplements, aspirin and NSAIDs for 7 days prior to surgery.     Follow your surgeon's direction on when to stop eating and drinking prior to surgery.    Your surgeon will be managing your pain after your surgery.      Remove all jewelry and metal piercings before your surgery.     Remove nail polish from fingers before surgery.    If you use a CPAP machine, bring this with you to surgery.     The longitudinal plan of care for the diagnosis(es)/condition(s) as documented were addressed during this visit. Due to the added complexity in care, I will continue to support Cornelius in the subsequent management and with ongoing continuity of care.       - No identified additional risk factors other than previously addressed         Recommendation  Approval given to proceed with proposed procedure, without further diagnostic evaluation.    Subjective   Cornelius is a 29  year old, presenting for the following:  Pre-Op Exam (Back surgery 4/9 )          4/7/2025     9:50 AM   Additional Questions   Roomed by BRIANNA BUTLER   Accompanied by Self     HPI: As above          4/7/2025   Pre-Op Questionnaire   Have you ever had a heart attack or stroke? No   Have you ever had surgery on your heart or blood vessels, such as a stent placement, a coronary artery bypass, or surgery on an artery in your head, neck, heart, or legs? No   Do you have chest pain with activity? No   Do you have a history of heart failure? No   Do you currently have a cold, bronchitis or symptoms of other infection? No   Do you have a cough, shortness of breath, or wheezing? No   Do you or anyone in your family have previous history of blood clots? No   Do you or does anyone in your family have a serious bleeding problem such as prolonged bleeding following surgeries or cuts? No   Have you ever had problems with anemia or been told to take iron pills? No   Have you had any abnormal blood loss such as black, tarry or bloody stools? No   Have you ever had a blood transfusion? No   Are you willing to have a blood transfusion if it is medically needed before, during, or after your surgery? Yes   Have you or any of your relatives ever had problems with anesthesia? No   Do you have sleep apnea, excessive snoring or daytime drowsiness? No   Do you have any artifical heart valves or other implanted medical devices like a pacemaker, defibrillator, or continuous glucose monitor? No   Do you have artificial joints? No   Are you allergic to latex? No     Health Care Directive  Patient does not have a Health Care Directive: Discussed advance care planning with patient; however, patient declined at this time.    Preoperative Review of    reviewed - controlled substances reflected in medication list.          There are no active problems to display for this patient.     No past medical history on file.  No past surgical history on  "file.  Current Outpatient Medications   Medication Sig Dispense Refill    atomoxetine (STRATTERA) 40 MG capsule Take 1 capsule (40 mg) by mouth daily. 30 capsule 0    atomoxetine (STRATTERA) 40 MG capsule Take 1 capsule (40 mg) by mouth daily. (Patient not taking: Reported on 4/7/2025) 30 capsule 0    atomoxetine (STRATTERA) 40 MG capsule Take 1 capsule (40 mg) by mouth daily. (Patient not taking: Reported on 4/7/2025) 30 capsule 0       No Known Allergies     Social History     Tobacco Use    Smoking status: Every Day     Types: Vaping Device     Start date: 1/7/2020    Smokeless tobacco: Never   Substance Use Topics    Alcohol use: Not Currently       History   Drug Use Not on file               Objective    /72 (BP Location: Right arm, Patient Position: Sitting, Cuff Size: Adult Regular)   Pulse 78   Temp 98.6  F (37  C) (Oral)   Resp 20   Ht 1.861 m (6' 1.25\")   Wt 72.6 kg (160 lb)   SpO2 99%   BMI 20.97 kg/m     Estimated body mass index is 20.97 kg/m  as calculated from the following:    Height as of this encounter: 1.861 m (6' 1.25\").    Weight as of this encounter: 72.6 kg (160 lb).  Physical Exam  GENERAL: alert and no distress  NECK: no adenopathy, no asymmetry, masses, or scars  RESP: lungs clear to auscultation - no rales, rhonchi or wheezes  CV: regular rate and rhythm, normal S1 S2, no S3 or S4, no murmur, click or rub, no peripheral edema  ABDOMEN: soft, nontender, no hepatosplenomegaly, no masses and bowel sounds normal  MS: no gross musculoskeletal defects noted, no edema    No results for input(s): \"HGB\", \"PLT\", \"INR\", \"NA\", \"POTASSIUM\", \"CR\", \"A1C\" in the last 8760 hours.     Diagnostics  No labs were ordered during this visit.   No EKG required, no history of coronary heart disease, significant arrhythmia, peripheral arterial disease or other structural heart disease.    Revised Cardiac Risk Index (RCRI)  The patient has the following serious cardiovascular risks for perioperative " complications:   - No serious cardiac risks = 0 points     RCRI Interpretation: 0 points: Class I (very low risk - 0.4% complication rate)         Signed Electronically by: Robert Hassan CNP  A copy of this evaluation report is provided to the requesting physician.

## 2025-08-10 ENCOUNTER — HEALTH MAINTENANCE LETTER (OUTPATIENT)
Age: 30
End: 2025-08-10